# Patient Record
Sex: MALE | URBAN - METROPOLITAN AREA
[De-identification: names, ages, dates, MRNs, and addresses within clinical notes are randomized per-mention and may not be internally consistent; named-entity substitution may affect disease eponyms.]

---

## 2020-08-31 ENCOUNTER — TELEPHONE ENCOUNTER (OUTPATIENT)
Dept: URBAN - METROPOLITAN AREA CLINIC 23 | Facility: CLINIC | Age: 25
End: 2020-08-31

## 2020-08-31 ENCOUNTER — OFFICE VISIT (OUTPATIENT)
Dept: URBAN - NONMETROPOLITAN AREA CLINIC 13 | Facility: CLINIC | Age: 25
End: 2020-08-31
Payer: SELF-PAY

## 2020-08-31 DIAGNOSIS — R19.5 MUCOUS IN STOOLS: ICD-10-CM

## 2020-08-31 DIAGNOSIS — R19.7 DIARRHEA, UNSPECIFIED TYPE: ICD-10-CM

## 2020-08-31 DIAGNOSIS — K62.5 RECTAL BLEEDING: ICD-10-CM

## 2020-08-31 PROCEDURE — 99204 OFFICE O/P NEW MOD 45 MIN: CPT | Performed by: INTERNAL MEDICINE

## 2020-08-31 PROCEDURE — G8420 CALC BMI NORM PARAMETERS: HCPCS | Performed by: INTERNAL MEDICINE

## 2020-08-31 PROCEDURE — G8427 DOCREV CUR MEDS BY ELIG CLIN: HCPCS | Performed by: INTERNAL MEDICINE

## 2020-08-31 RX ORDER — METRONIDAZOLE 500 MG/1
1 TABLET TABLET, FILM COATED ORAL THREE TIMES A DAY
Qty: 30 | OUTPATIENT
Start: 2020-08-31

## 2020-08-31 RX ORDER — CIPROFLOXACIN HYDROCHLORIDE 500 MG/1
1 TABLET TABLET, FILM COATED ORAL
Qty: 20 | OUTPATIENT
Start: 2020-08-31

## 2020-08-31 NOTE — HPI-TODAY'S VISIT:
Mr. Andreas uJrado is a pleasant 23 y/o M who presents for rectal bleeding, mucous in stools, and chronic diarrhea.  He states that since March he developed these symptoms.  No abdominal pain or weight loss.  No joint pains or rashes.  Blood is bright red in color.  He states that labs have looked ok but his other doctors are concerned he might have IBS or IBD.  He is otherwise healthy, on no medications.  He just started a new job recently.

## 2020-09-18 ENCOUNTER — TELEPHONE ENCOUNTER (OUTPATIENT)
Dept: URBAN - METROPOLITAN AREA CLINIC 92 | Facility: CLINIC | Age: 25
End: 2020-09-18

## 2020-09-18 ENCOUNTER — CLAIMS CREATED FROM THE CLAIM WINDOW (OUTPATIENT)
Dept: URBAN - METROPOLITAN AREA CLINIC 4 | Facility: CLINIC | Age: 25
End: 2020-09-18
Payer: SELF-PAY

## 2020-09-18 ENCOUNTER — OFFICE VISIT (OUTPATIENT)
Dept: URBAN - NONMETROPOLITAN AREA SURGERY CENTER 1 | Facility: SURGERY CENTER | Age: 25
End: 2020-09-18
Payer: SELF-PAY

## 2020-09-18 DIAGNOSIS — K51.919 ULCERATIVE COLITIS, UNSPECIFIED WITH UNSPECIFIED COMPLICATIONS: ICD-10-CM

## 2020-09-18 DIAGNOSIS — K51.80 CHRONIC PANCOLONIC ULCERATIVE COLITIS: ICD-10-CM

## 2020-09-18 DIAGNOSIS — K62.5 ANAL BLEEDING: ICD-10-CM

## 2020-09-18 DIAGNOSIS — K51.811 OTHER ULCERATIVE COLITIS WITH RECTAL BLEEDING: ICD-10-CM

## 2020-09-18 DIAGNOSIS — R19.4 ALTERED BOWEL HABITS: ICD-10-CM

## 2020-09-18 PROCEDURE — G9937 DIG OR SURV COLSCO: HCPCS | Performed by: INTERNAL MEDICINE

## 2020-09-18 PROCEDURE — G8907 PT DOC NO EVENTS ON DISCHARG: HCPCS | Performed by: INTERNAL MEDICINE

## 2020-09-18 PROCEDURE — 88305 TISSUE EXAM BY PATHOLOGIST: CPT | Performed by: PATHOLOGY

## 2020-09-18 PROCEDURE — 45380 COLONOSCOPY AND BIOPSY: CPT | Performed by: INTERNAL MEDICINE

## 2020-09-18 RX ORDER — METRONIDAZOLE 500 MG/1
1 TABLET TABLET, FILM COATED ORAL THREE TIMES A DAY
Qty: 30 | Status: ACTIVE | COMMUNITY
Start: 2020-08-31

## 2020-09-18 RX ORDER — BALSALAZIDE DISODIUM 750 MG/1
2 CAPSULES CAPSULE ORAL TWICE A DAY
Qty: 120 CAPSULE | Refills: 1 | OUTPATIENT
Start: 2020-09-18 | End: 2020-11-16

## 2020-09-18 RX ORDER — CIPROFLOXACIN HYDROCHLORIDE 500 MG/1
1 TABLET TABLET, FILM COATED ORAL
Qty: 20 | Status: ACTIVE | COMMUNITY
Start: 2020-08-31

## 2020-09-18 RX ORDER — PREDNISONE 20 MG/1
2 TABLETS TABLET ORAL ONCE A DAY
Qty: 60 TABLET | Refills: 2 | OUTPATIENT
Start: 2020-09-18 | End: 2020-12-16

## 2020-10-05 ENCOUNTER — OFFICE VISIT (OUTPATIENT)
Dept: URBAN - NONMETROPOLITAN AREA CLINIC 13 | Facility: CLINIC | Age: 25
End: 2020-10-05
Payer: SELF-PAY

## 2020-10-05 DIAGNOSIS — K51.90 ULCERATIVE COLITIS: ICD-10-CM

## 2020-10-05 PROCEDURE — 99213 OFFICE O/P EST LOW 20 MIN: CPT | Performed by: NURSE PRACTITIONER

## 2020-10-05 PROCEDURE — G9903 PT SCRN TBCO ID AS NON USER: HCPCS | Performed by: NURSE PRACTITIONER

## 2020-10-05 PROCEDURE — G8427 DOCREV CUR MEDS BY ELIG CLIN: HCPCS | Performed by: NURSE PRACTITIONER

## 2020-10-05 PROCEDURE — G8420 CALC BMI NORM PARAMETERS: HCPCS | Performed by: NURSE PRACTITIONER

## 2020-10-05 RX ORDER — CIPROFLOXACIN HYDROCHLORIDE 500 MG/1
1 TABLET TABLET, FILM COATED ORAL
Qty: 20 | Status: ACTIVE | COMMUNITY
Start: 2020-08-31

## 2020-10-05 RX ORDER — BALSALAZIDE DISODIUM 750 MG/1
2 CAPSULES CAPSULE ORAL TWICE A DAY
Qty: 360
Start: 2020-09-18

## 2020-10-05 RX ORDER — METRONIDAZOLE 500 MG/1
1 TABLET TABLET, FILM COATED ORAL THREE TIMES A DAY
Qty: 30 | Status: ACTIVE | COMMUNITY
Start: 2020-08-31

## 2020-10-05 RX ORDER — BALSALAZIDE DISODIUM 750 MG/1
2 CAPSULES CAPSULE ORAL TWICE A DAY
Qty: 120 CAPSULE | Refills: 1 | Status: ACTIVE | COMMUNITY
Start: 2020-09-18 | End: 2020-11-16

## 2020-10-05 RX ORDER — PREDNISONE 20 MG/1
2 TABLETS TABLET ORAL ONCE A DAY
Qty: 60 TABLET | Refills: 2 | Status: ACTIVE | COMMUNITY
Start: 2020-09-18 | End: 2020-12-16

## 2020-10-05 NOTE — HPI-TODAY'S VISIT:
10/5/2020 Patient returns for colonoscopy follow-up.  Patient returns from recent colonoscopy with inflammation from hepatic flexure to anus.  Pathology consistent with ulcerative colitis.  Today, he reports he is feeling much better.  He is no longer having abdominal pain, loose stool, or rectal bleeding.  He is on balsalazide 2 caps twice daily and prednisone 40 mg daily.  He is hesitant to stop his prednisone as he is feeling so much better.  He has lost 50 pounds throughout this ordeal.  He does report he has put about 10 of them back on.  He asked if he can return to work.sb

## 2020-11-12 ENCOUNTER — ERX REFILL RESPONSE (OUTPATIENT)
Age: 25
End: 2020-11-12

## 2020-11-12 ENCOUNTER — ERX REFILL RESPONSE (OUTPATIENT)
Dept: URBAN - METROPOLITAN AREA CLINIC 92 | Facility: CLINIC | Age: 25
End: 2020-11-12

## 2020-11-12 RX ORDER — BALSALAZIDE DISODIUM 750 MG/1
TAKE 2 CAPSULES BY MOUTH TWICE A DAY CAPSULE ORAL
Qty: 360 | Refills: 0

## 2020-11-12 RX ORDER — BALSALAZIDE DISODIUM 750 MG/1
2 CAPSULES CAPSULE ORAL TWICE A DAY
Qty: 120 | Refills: 0

## 2021-01-04 ENCOUNTER — OFFICE VISIT (OUTPATIENT)
Dept: URBAN - NONMETROPOLITAN AREA CLINIC 13 | Facility: CLINIC | Age: 26
End: 2021-01-04
Payer: SELF-PAY

## 2021-01-04 ENCOUNTER — OFFICE VISIT (OUTPATIENT)
Dept: URBAN - NONMETROPOLITAN AREA CLINIC 13 | Facility: CLINIC | Age: 26
End: 2021-01-04

## 2021-01-04 DIAGNOSIS — K51.90 ULCERATIVE COLITIS: ICD-10-CM

## 2021-01-04 PROCEDURE — 99212 OFFICE O/P EST SF 10 MIN: CPT | Performed by: NURSE PRACTITIONER

## 2021-01-04 PROCEDURE — G8482 FLU IMMUNIZE ORDER/ADMIN: HCPCS | Performed by: NURSE PRACTITIONER

## 2021-01-04 PROCEDURE — G9903 PT SCRN TBCO ID AS NON USER: HCPCS | Performed by: NURSE PRACTITIONER

## 2021-01-04 PROCEDURE — G8427 DOCREV CUR MEDS BY ELIG CLIN: HCPCS | Performed by: NURSE PRACTITIONER

## 2021-01-04 PROCEDURE — G8420 CALC BMI NORM PARAMETERS: HCPCS | Performed by: NURSE PRACTITIONER

## 2021-01-04 RX ORDER — BALSALAZIDE DISODIUM 750 MG/1
2 CAPSULES CAPSULE ORAL TWICE A DAY
Qty: 120 | Refills: 0 | Status: ACTIVE | COMMUNITY

## 2021-01-04 RX ORDER — METRONIDAZOLE 500 MG/1
1 TABLET TABLET, FILM COATED ORAL THREE TIMES A DAY
Qty: 30 | Status: ACTIVE | COMMUNITY
Start: 2020-08-31

## 2021-01-04 RX ORDER — BALSALAZIDE DISODIUM 750 MG/1
TAKE 2 CAPSULES BY MOUTH TWICE A DAY CAPSULE ORAL
Qty: 360 | Refills: 0 | Status: ACTIVE | COMMUNITY

## 2021-01-04 RX ORDER — CIPROFLOXACIN HYDROCHLORIDE 500 MG/1
1 TABLET TABLET, FILM COATED ORAL
Qty: 20 | Status: ACTIVE | COMMUNITY
Start: 2020-08-31

## 2021-01-04 RX ORDER — BALSALAZIDE DISODIUM 750 MG/1
2 CAPSULES CAPSULE ORAL TWICE A DAY
Qty: 360

## 2021-01-04 NOTE — HPI-TODAY'S VISIT:
Mr deni  returns for UC f/u.  Patient returns from recent 2020 colonoscopy with inflammation from hepatic flexure to anus.  Pathology consistent with ulcerative colitis.  Today, he reports he is feeling much better. He has weaned off steroids and denies any loose stool. some occasional cramping if he eats too much.  He is on balsalazide 2 caps twice daily. SB

## 2021-03-12 ENCOUNTER — TELEPHONE ENCOUNTER (OUTPATIENT)
Dept: URBAN - METROPOLITAN AREA CLINIC 92 | Facility: CLINIC | Age: 26
End: 2021-03-12

## 2021-03-12 RX ORDER — PREDNISONE 20 MG/1
40MG X7 DAYS, 30MGX 7 DAYS, 20MG X 7 DAYS, AND 10MG X7 DAYS AND STOP TABLET ORAL ONCE A DAY
Qty: 35 TABLET | Refills: 0 | OUTPATIENT
Start: 2021-03-12 | End: 2021-04-09

## 2021-07-12 ENCOUNTER — WEB ENCOUNTER (OUTPATIENT)
Dept: URBAN - NONMETROPOLITAN AREA CLINIC 13 | Facility: CLINIC | Age: 26
End: 2021-07-12

## 2021-07-12 ENCOUNTER — OFFICE VISIT (OUTPATIENT)
Dept: URBAN - NONMETROPOLITAN AREA CLINIC 13 | Facility: CLINIC | Age: 26
End: 2021-07-12
Payer: SELF-PAY

## 2021-07-12 DIAGNOSIS — K51.90 ULCERATIVE COLITIS: ICD-10-CM

## 2021-07-12 PROCEDURE — 99213 OFFICE O/P EST LOW 20 MIN: CPT | Performed by: INTERNAL MEDICINE

## 2021-07-12 RX ORDER — BALSALAZIDE DISODIUM 750 MG/1
TAKE 2 CAPSULES BY MOUTH TWICE A DAY CAPSULE ORAL
Qty: 360 | Refills: 0 | Status: ACTIVE | COMMUNITY

## 2021-07-12 RX ORDER — SULFASALAZINE 500 MG/1
1 TABLET TABLET ORAL
Qty: 120 | Refills: 6 | OUTPATIENT
Start: 2021-07-12 | End: 2022-02-06

## 2021-07-12 RX ORDER — BALSALAZIDE DISODIUM 750 MG/1
2 CAPSULES CAPSULE ORAL TWICE A DAY
Qty: 360 | Status: ACTIVE | COMMUNITY

## 2021-07-12 RX ORDER — METRONIDAZOLE 500 MG/1
1 TABLET TABLET, FILM COATED ORAL THREE TIMES A DAY
Qty: 30 | Status: ACTIVE | COMMUNITY
Start: 2020-08-31

## 2021-07-12 RX ORDER — CIPROFLOXACIN HYDROCHLORIDE 500 MG/1
1 TABLET TABLET, FILM COATED ORAL
Qty: 20 | Status: ACTIVE | COMMUNITY
Start: 2020-08-31

## 2021-07-12 NOTE — HPI-TODAY'S VISIT:
Mr cheema  returns for UC f/u.  Patient returns from recent 2020 colonoscopy with inflammation from hepatic flexure to anus.  Pathology consistent with ulcerative colitis.  Today, he reports he has some loose stool and bleeding some days. some occasional cramping if he eats too much.  He is on balsalazide 2 caps twice daily. SB

## 2022-01-17 ENCOUNTER — OFFICE VISIT (OUTPATIENT)
Dept: URBAN - NONMETROPOLITAN AREA CLINIC 13 | Facility: CLINIC | Age: 27
End: 2022-01-17

## 2022-01-24 ENCOUNTER — OFFICE VISIT (OUTPATIENT)
Dept: URBAN - NONMETROPOLITAN AREA CLINIC 13 | Facility: CLINIC | Age: 27
End: 2022-01-24
Payer: SELF-PAY

## 2022-01-24 VITALS
HEIGHT: 71 IN | HEART RATE: 73 BPM | BODY MASS INDEX: 32.2 KG/M2 | SYSTOLIC BLOOD PRESSURE: 131 MMHG | WEIGHT: 230 LBS | DIASTOLIC BLOOD PRESSURE: 83 MMHG

## 2022-01-24 DIAGNOSIS — K51.90 ULCERATIVE COLITIS: ICD-10-CM

## 2022-01-24 PROCEDURE — 99213 OFFICE O/P EST LOW 20 MIN: CPT | Performed by: INTERNAL MEDICINE

## 2022-01-24 RX ORDER — SULFASALAZINE 500 MG/1
1 TABLET TABLET ORAL
Qty: 120 | Refills: 6 | Status: ACTIVE | COMMUNITY
Start: 2021-07-12 | End: 2022-02-06

## 2022-01-24 RX ORDER — CIPROFLOXACIN HYDROCHLORIDE 500 MG/1
1 TABLET TABLET, FILM COATED ORAL
Qty: 20 | Status: ACTIVE | COMMUNITY
Start: 2020-08-31

## 2022-01-24 RX ORDER — SULFASALAZINE 500 MG/1
1 TABLET TABLET ORAL
Qty: 120 | Refills: 6 | OUTPATIENT

## 2022-01-24 RX ORDER — METRONIDAZOLE 500 MG/1
1 TABLET TABLET, FILM COATED ORAL THREE TIMES A DAY
Qty: 30 | Status: ACTIVE | COMMUNITY
Start: 2020-08-31

## 2022-01-24 RX ORDER — BALSALAZIDE DISODIUM 750 MG/1
TAKE 2 CAPSULES BY MOUTH TWICE A DAY CAPSULE ORAL
Qty: 360 | Refills: 0 | Status: ACTIVE | COMMUNITY

## 2022-01-24 RX ORDER — BALSALAZIDE DISODIUM 750 MG/1
2 CAPSULES CAPSULE ORAL TWICE A DAY
Qty: 360 | Status: ACTIVE | COMMUNITY

## 2022-01-24 NOTE — HPI-TODAY'S VISIT:
1/24/22: Mr. Cheema returns for follow-up of extensive ulcerative colitis.  Since his last clinic visit, he was changed from balsalazide 2 caps twice daily to sulfsalazine 500 mg po QID.  Today he reports that he has been taking sufasalazine 2 pills in the AM only.  He is having some mucous in his stools and occasional bleeding.  He is having 3 stools daily.  7/12/21: Mr cheema  returns for UC f/u.  Patient returns from recent 2020 colonoscopy with inflammation from hepatic flexure to anus.  Pathology consistent with ulcerative colitis.  Today, he reports he has some loose stool and bleeding some days. some occasional cramping if he eats too much.  He is on balsalazide 2 caps twice daily. SB

## 2022-04-25 ENCOUNTER — OFFICE VISIT (OUTPATIENT)
Dept: URBAN - NONMETROPOLITAN AREA CLINIC 13 | Facility: CLINIC | Age: 27
End: 2022-04-25
Payer: SELF-PAY

## 2022-04-25 VITALS
BODY MASS INDEX: 32.76 KG/M2 | WEIGHT: 234 LBS | DIASTOLIC BLOOD PRESSURE: 81 MMHG | HEART RATE: 80 BPM | HEIGHT: 71 IN | SYSTOLIC BLOOD PRESSURE: 134 MMHG

## 2022-04-25 DIAGNOSIS — D84.9 IMMUNOSUPPRESSED STATUS: ICD-10-CM

## 2022-04-25 DIAGNOSIS — K51.90 ULCERATIVE COLITIS: ICD-10-CM

## 2022-04-25 PROCEDURE — 99213 OFFICE O/P EST LOW 20 MIN: CPT | Performed by: NURSE PRACTITIONER

## 2022-04-25 RX ORDER — BALSALAZIDE DISODIUM 750 MG/1
TAKE 2 CAPSULES BY MOUTH TWICE A DAY CAPSULE ORAL
Qty: 360 | Refills: 0 | Status: ACTIVE | COMMUNITY

## 2022-04-25 RX ORDER — ADALIMUMAB 40MG/0.4ML
AS DIRECTED START DAY 28 KIT SUBCUTANEOUS EVERY OTHER WEEK
Qty: 2 PRE-FILLED PEN SYRINGE | Refills: 6 | OUTPATIENT
Start: 2022-04-25 | End: 2022-11-06

## 2022-04-25 RX ORDER — ADALIMUMAB 80MG/0.8ML
STARTER KIT. 160MG DAY 1 AND 80MG DAY 14 KIT SUBCUTANEOUS EVERY 2 WEEKS
Qty: 3 PRE-FILLED PEN SYRINGE | Refills: 0 | OUTPATIENT
Start: 2022-04-25 | End: 2022-05-23

## 2022-04-25 RX ORDER — BALSALAZIDE DISODIUM 750 MG/1
2 CAPSULES CAPSULE ORAL TWICE A DAY
Qty: 360 | Status: ACTIVE | COMMUNITY

## 2022-04-25 RX ORDER — METRONIDAZOLE 500 MG/1
1 TABLET TABLET, FILM COATED ORAL THREE TIMES A DAY
Qty: 30 | Status: ACTIVE | COMMUNITY
Start: 2020-08-31

## 2022-04-25 RX ORDER — CIPROFLOXACIN HYDROCHLORIDE 500 MG/1
1 TABLET TABLET, FILM COATED ORAL
Qty: 20 | Status: ACTIVE | COMMUNITY
Start: 2020-08-31

## 2022-04-25 RX ORDER — SULFASALAZINE 500 MG/1
1 TABLET TABLET ORAL
Qty: 120 | Refills: 6 | Status: ACTIVE | COMMUNITY

## 2022-04-28 ENCOUNTER — TELEPHONE ENCOUNTER (OUTPATIENT)
Dept: URBAN - METROPOLITAN AREA CLINIC 92 | Facility: CLINIC | Age: 27
End: 2022-04-28

## 2022-04-28 LAB
A/G RATIO: 1.7
ALBUMIN: 4.6
ALKALINE PHOSPHATASE: 93
ALT (SGPT): 24
AST (SGOT): 20
BASO (ABSOLUTE): 0
BASOS: 0
BILIRUBIN, TOTAL: 0.5
BUN/CREATININE RATIO: 16
BUN: 15
CALCIUM: 10
CARBON DIOXIDE, TOTAL: 20
CHLORIDE: 103
CREATININE: 0.91
EGFR: 119
EOS (ABSOLUTE): 0.1
EOS: 2
GLOBULIN, TOTAL: 2.7
GLUCOSE: 102
HBSAG SCREEN: NEGATIVE
HCV AB: <0.1
HEMATOCRIT: 45.6
HEMATOLOGY COMMENTS:: (no result)
HEMOGLOBIN: 15.1
HEP A AB, IGM: NEGATIVE
HEP B CORE AB, IGM: NEGATIVE
IMMATURE CELLS: (no result)
IMMATURE GRANS (ABS): 0
IMMATURE GRANULOCYTES: 0
INTERPRETATION:: (no result)
LYMPHS (ABSOLUTE): 1.5
LYMPHS: 28
MCH: 30.4
MCHC: 33.1
MCV: 92
MONOCYTES(ABSOLUTE): 0.4
MONOCYTES: 7
NEUTROPHILS (ABSOLUTE): 3.3
NEUTROPHILS: 63
NRBC: (no result)
PLATELETS: 205
POTASSIUM: 4.2
PROTEIN, TOTAL: 7.3
QUANTIFERON CRITERIA: (no result)
QUANTIFERON INCUBATION: (no result)
QUANTIFERON MITOGEN VALUE: >10
QUANTIFERON NIL VALUE: 0.19
QUANTIFERON TB1 AG VALUE: 0.16
QUANTIFERON TB2 AG VALUE: 0.16
QUANTIFERON-TB GOLD PLUS: NEGATIVE
RBC: 4.97
RDW: 11.8
SODIUM: 141
WBC: 5.4

## 2022-10-25 ENCOUNTER — OFFICE VISIT (OUTPATIENT)
Dept: URBAN - NONMETROPOLITAN AREA CLINIC 2 | Facility: CLINIC | Age: 27
End: 2022-10-25
Payer: SELF-PAY

## 2022-10-25 ENCOUNTER — WEB ENCOUNTER (OUTPATIENT)
Dept: URBAN - NONMETROPOLITAN AREA CLINIC 2 | Facility: CLINIC | Age: 27
End: 2022-10-25

## 2022-10-25 VITALS
SYSTOLIC BLOOD PRESSURE: 110 MMHG | WEIGHT: 245 LBS | TEMPERATURE: 98.5 F | HEIGHT: 71 IN | DIASTOLIC BLOOD PRESSURE: 70 MMHG | HEART RATE: 88 BPM | BODY MASS INDEX: 34.3 KG/M2

## 2022-10-25 DIAGNOSIS — D84.9 IMMUNOSUPPRESSED STATUS: ICD-10-CM

## 2022-10-25 DIAGNOSIS — K51.90 ULCERATIVE COLITIS: ICD-10-CM

## 2022-10-25 PROBLEM — 38013005: Status: ACTIVE | Noted: 2022-04-25

## 2022-10-25 PROBLEM — 64766004 ULCERATIVE COLITIS: Status: ACTIVE | Noted: 2020-10-05

## 2022-10-25 PROCEDURE — 99213 OFFICE O/P EST LOW 20 MIN: CPT | Performed by: NURSE PRACTITIONER

## 2022-10-25 RX ORDER — METRONIDAZOLE 500 MG/1
1 TABLET TABLET, FILM COATED ORAL THREE TIMES A DAY
Qty: 30 | Status: ACTIVE | COMMUNITY
Start: 2020-08-31

## 2022-10-25 RX ORDER — ADALIMUMAB 40MG/0.4ML
AS DIRECTED START DAY 28 KIT SUBCUTANEOUS EVERY OTHER WEEK
Qty: 2 PRE-FILLED PEN SYRINGE | Refills: 6 | OUTPATIENT

## 2022-10-25 RX ORDER — SULFASALAZINE 500 MG/1
1 TABLET TABLET ORAL
Qty: 120 | Refills: 6 | Status: ACTIVE | COMMUNITY

## 2022-10-25 RX ORDER — CIPROFLOXACIN HYDROCHLORIDE 500 MG/1
1 TABLET TABLET, FILM COATED ORAL
Qty: 20 | Status: ACTIVE | COMMUNITY
Start: 2020-08-31

## 2022-10-25 RX ORDER — BALSALAZIDE DISODIUM 750 MG/1
TAKE 2 CAPSULES BY MOUTH TWICE A DAY CAPSULE ORAL
Qty: 360 | Refills: 0 | Status: ACTIVE | COMMUNITY

## 2022-10-25 RX ORDER — ADALIMUMAB 40MG/0.4ML
AS DIRECTED START DAY 28 KIT SUBCUTANEOUS EVERY OTHER WEEK
Qty: 2 PRE-FILLED PEN SYRINGE | Refills: 6 | Status: ACTIVE | COMMUNITY
Start: 2022-04-25 | End: 2022-11-06

## 2022-10-25 RX ORDER — BALSALAZIDE DISODIUM 750 MG/1
2 CAPSULES CAPSULE ORAL TWICE A DAY
Qty: 360 | Status: ACTIVE | COMMUNITY

## 2022-10-25 NOTE — PHYSICAL EXAM NEUROLOGIC:
Hpi Title: Evaluation of Skin Lesions oriented to person, place and time How Severe Are Your Spot(S)?: mild Have Your Spot(S) Been Treated In The Past?: has not been treated

## 2022-10-25 NOTE — HPI-TODAY'S VISIT:
Mr deni  returns for UC f/u.  Patient returns from recent 2020 colonoscopy with inflammation from hepatic flexure to anus.  Pathology consistent with ulcerative colitis. He was able to get on patient assistance for humira a few months ago and is doing much better. stools are regular. no blood. SB

## 2022-10-26 LAB
A/G RATIO: 1.6
ABSOLUTE BASOPHILS: 17
ABSOLUTE EOSINOPHILS: 129
ABSOLUTE LYMPHOCYTES: 2786
ABSOLUTE MONOCYTES: 490
ABSOLUTE NEUTROPHILS: 5177
ALBUMIN: 4.3
ALKALINE PHOSPHATASE: 82
ALT (SGPT): 19
AST (SGOT): 15
BASOPHILS: 0.2
BILIRUBIN, TOTAL: 0.9
BUN/CREATININE RATIO: 15
BUN: 19
CALCIUM: 9.1
CARBON DIOXIDE, TOTAL: 26
CHLORIDE: 105
CREATININE: 1.25
EGFR: 81
EOSINOPHILS: 1.5
GLOBULIN, TOTAL: 2.7
GLUCOSE: 95
HEMATOCRIT: 44.2
HEMOGLOBIN: 15.3
LYMPHOCYTES: 32.4
MCH: 30.4
MCHC: 34.6
MCV: 87.9
MONOCYTES: 5.7
MPV: 12.3
NEUTROPHILS: 60.2
PLATELET COUNT: 219
POTASSIUM: 4.3
PROTEIN, TOTAL: 7
RDW: 11.9
RED BLOOD CELL COUNT: 5.03
SODIUM: 139
WHITE BLOOD CELL COUNT: 8.6

## 2022-11-02 ENCOUNTER — TELEPHONE ENCOUNTER (OUTPATIENT)
Dept: URBAN - NONMETROPOLITAN AREA CLINIC 2 | Facility: CLINIC | Age: 27
End: 2022-11-02

## 2023-04-28 ENCOUNTER — OFFICE VISIT (OUTPATIENT)
Dept: URBAN - NONMETROPOLITAN AREA CLINIC 2 | Facility: CLINIC | Age: 28
End: 2023-04-28
Payer: SELF-PAY

## 2023-04-28 VITALS
HEART RATE: 85 BPM | HEIGHT: 71 IN | DIASTOLIC BLOOD PRESSURE: 87 MMHG | BODY MASS INDEX: 34.3 KG/M2 | SYSTOLIC BLOOD PRESSURE: 106 MMHG | WEIGHT: 245 LBS

## 2023-04-28 DIAGNOSIS — D84.9 IMMUNOSUPPRESSED STATUS: ICD-10-CM

## 2023-04-28 DIAGNOSIS — K51.80 CHRONIC PANCOLONIC ULCERATIVE COLITIS: ICD-10-CM

## 2023-04-28 DIAGNOSIS — K51.90 ULCERATIVE COLITIS: ICD-10-CM

## 2023-04-28 PROCEDURE — 99213 OFFICE O/P EST LOW 20 MIN: CPT | Performed by: INTERNAL MEDICINE

## 2023-04-28 RX ORDER — CIPROFLOXACIN HYDROCHLORIDE 500 MG/1
1 TABLET TABLET, FILM COATED ORAL
Qty: 20 | Status: ACTIVE | COMMUNITY
Start: 2020-08-31

## 2023-04-28 RX ORDER — ADALIMUMAB 40MG/0.4ML
AS DIRECTED START DAY 28 KIT SUBCUTANEOUS EVERY OTHER WEEK
Qty: 2 PRE-FILLED PEN SYRINGE | Refills: 6 | Status: ACTIVE | COMMUNITY

## 2023-04-28 RX ORDER — SULFASALAZINE 500 MG/1
1 TABLET TABLET ORAL
Qty: 120 | Refills: 6 | Status: ACTIVE | COMMUNITY

## 2023-04-28 RX ORDER — BALSALAZIDE DISODIUM 750 MG/1
TAKE 2 CAPSULES BY MOUTH TWICE A DAY CAPSULE ORAL
Qty: 360 | Refills: 0 | Status: ACTIVE | COMMUNITY

## 2023-04-28 RX ORDER — ADALIMUMAB 40MG/0.4ML
AS DIRECTED START DAY 28 KIT SUBCUTANEOUS EVERY OTHER WEEK
Qty: 2 PRE-FILLED PEN SYRINGE | Refills: 6 | OUTPATIENT

## 2023-04-28 RX ORDER — METRONIDAZOLE 500 MG/1
1 TABLET TABLET, FILM COATED ORAL THREE TIMES A DAY
Qty: 30 | Status: ACTIVE | COMMUNITY
Start: 2020-08-31

## 2023-04-28 RX ORDER — BALSALAZIDE DISODIUM 750 MG/1
2 CAPSULES CAPSULE ORAL TWICE A DAY
Qty: 360 | Status: ACTIVE | COMMUNITY

## 2023-04-28 NOTE — HPI-TODAY'S VISIT:
4/28/23: Mr Cheema returns for UC f/u.  He has been on Humira now for about a year and is doing well on it.  He denies any current rectal bleeding or diarrhea.  He is getting Humira monthly with patient assistance.  10/25/22: Mr cheema  returns for UC f/u.  Patient returns from recent 2020 colonoscopy with inflammation from hepatic flexure to anus.  Pathology consistent with ulcerative colitis. He was able to get on patient assistance for humira a few months ago and is doing much better. stools are regular. no blood. SB

## 2023-10-27 ENCOUNTER — OFFICE VISIT (OUTPATIENT)
Dept: URBAN - NONMETROPOLITAN AREA CLINIC 2 | Facility: CLINIC | Age: 28
End: 2023-10-27
Payer: SELF-PAY

## 2023-10-27 VITALS
HEART RATE: 74 BPM | BODY MASS INDEX: 34.86 KG/M2 | SYSTOLIC BLOOD PRESSURE: 160 MMHG | HEIGHT: 71 IN | DIASTOLIC BLOOD PRESSURE: 94 MMHG | WEIGHT: 249 LBS

## 2023-10-27 DIAGNOSIS — D84.9 IMMUNOSUPPRESSED STATUS: ICD-10-CM

## 2023-10-27 DIAGNOSIS — K51.90 ULCERATIVE COLITIS: ICD-10-CM

## 2023-10-27 PROCEDURE — 99214 OFFICE O/P EST MOD 30 MIN: CPT | Performed by: INTERNAL MEDICINE

## 2023-10-27 RX ORDER — BALSALAZIDE DISODIUM 750 MG/1
TAKE 2 CAPSULES BY MOUTH TWICE A DAY CAPSULE ORAL
Qty: 360 | Refills: 0 | Status: DISCONTINUED | COMMUNITY

## 2023-10-27 RX ORDER — METRONIDAZOLE 500 MG/1
1 TABLET TABLET, FILM COATED ORAL THREE TIMES A DAY
Qty: 30 | Status: DISCONTINUED | COMMUNITY
Start: 2020-08-31

## 2023-10-27 RX ORDER — BALSALAZIDE DISODIUM 750 MG/1
2 CAPSULES CAPSULE ORAL TWICE A DAY
Qty: 360 | Status: DISCONTINUED | COMMUNITY

## 2023-10-27 RX ORDER — CIPROFLOXACIN HYDROCHLORIDE 500 MG/1
1 TABLET TABLET, FILM COATED ORAL
Qty: 20 | Status: DISCONTINUED | COMMUNITY
Start: 2020-08-31

## 2023-10-27 RX ORDER — ADALIMUMAB 40MG/0.4ML
AS DIRECTED START DAY 28 KIT SUBCUTANEOUS EVERY OTHER WEEK
Qty: 2 PRE-FILLED PEN SYRINGE | Refills: 6 | Status: ACTIVE | COMMUNITY

## 2023-10-27 RX ORDER — ADALIMUMAB 40MG/0.4ML
AS DIRECTED START DAY 28 KIT SUBCUTANEOUS EVERY OTHER WEEK
Qty: 2 PRE-FILLED PEN SYRINGE | Refills: 6 | OUTPATIENT

## 2023-10-27 RX ORDER — SULFASALAZINE 500 MG/1
1 TABLET TABLET ORAL
Qty: 120 | Refills: 6 | Status: DISCONTINUED | COMMUNITY

## 2023-10-27 NOTE — HPI-TODAY'S VISIT:
10/27/23: Mr. Cheema returns for follow-up of ulcerative colitis.  He has been maintained on Humira.  He has been doing remarkably well on Humira.  No diarrhea or rectal bleeding.  He is changing jobs and hopeful to have health insurance soon.    4/28/23: Mr Cheema returns for UC f/u.  He has been on Humira now for about a year and is doing well on it.  He denies any current rectal bleeding or diarrhea.  He is getting Humira monthly with patient assistance.  10/25/22: Mr cheema  returns for UC f/u.  Patient returns from recent 2020 colonoscopy with inflammation from hepatic flexure to anus.  Pathology consistent with ulcerative colitis. He was able to get on patient assistance for humira a few months ago and is doing much better. stools are regular. no blood. SB

## 2024-04-22 ENCOUNTER — OV EP (OUTPATIENT)
Dept: URBAN - NONMETROPOLITAN AREA CLINIC 2 | Facility: CLINIC | Age: 29
End: 2024-04-22
Payer: SELF-PAY

## 2024-04-22 VITALS
DIASTOLIC BLOOD PRESSURE: 83 MMHG | HEART RATE: 80 BPM | BODY MASS INDEX: 35 KG/M2 | SYSTOLIC BLOOD PRESSURE: 134 MMHG | HEIGHT: 71 IN | WEIGHT: 250 LBS

## 2024-04-22 DIAGNOSIS — K51.90 ULCERATIVE COLITIS: ICD-10-CM

## 2024-04-22 DIAGNOSIS — D84.9 IMMUNOSUPPRESSED STATUS: ICD-10-CM

## 2024-04-22 PROCEDURE — 99214 OFFICE O/P EST MOD 30 MIN: CPT | Performed by: NURSE PRACTITIONER

## 2024-04-22 RX ORDER — ADALIMUMAB 40MG/0.4ML
AS DIRECTED START DAY 28 KIT SUBCUTANEOUS EVERY OTHER WEEK
Qty: 2 PRE-FILLED PEN SYRINGE | Refills: 6 | OUTPATIENT

## 2024-04-22 RX ORDER — ADALIMUMAB 40MG/0.4ML
AS DIRECTED START DAY 28 KIT SUBCUTANEOUS EVERY OTHER WEEK
Qty: 2 PRE-FILLED PEN SYRINGE | Refills: 6 | Status: ACTIVE | COMMUNITY

## 2024-04-22 NOTE — HPI-TODAY'S VISIT:
Mr. Johns is a 28-year-old male who presents today for follow-up of ulcerative colitis.  He has been on Humira since 2022 and continues to do well.  He denies any abdominal pain, rectal bleeding, diarrhea, or weight loss.  He unfortunately lost his job that he obtained around 6 months ago on Tuesday.  He states he is unable to obtain labs or proceed with routine colonoscopy at this time.  Patient aware that he is due for these items and will contact us when he gets insurance or money to afford the procedures and labs.  Denies any GI questions or concerns he reports feeling very well.  LG.

## 2024-04-26 ENCOUNTER — OV EP (OUTPATIENT)
Dept: URBAN - NONMETROPOLITAN AREA CLINIC 2 | Facility: CLINIC | Age: 29
End: 2024-04-26

## 2024-06-27 ENCOUNTER — TELEPHONE ENCOUNTER (OUTPATIENT)
Dept: URBAN - NONMETROPOLITAN AREA CLINIC 2 | Facility: CLINIC | Age: 29
End: 2024-06-27

## 2024-10-21 ENCOUNTER — OFFICE VISIT (OUTPATIENT)
Dept: URBAN - NONMETROPOLITAN AREA CLINIC 2 | Facility: CLINIC | Age: 29
End: 2024-10-21
Payer: SELF-PAY

## 2024-10-21 ENCOUNTER — DASHBOARD ENCOUNTERS (OUTPATIENT)
Age: 29
End: 2024-10-21

## 2024-10-21 VITALS
HEIGHT: 71 IN | DIASTOLIC BLOOD PRESSURE: 82 MMHG | SYSTOLIC BLOOD PRESSURE: 125 MMHG | WEIGHT: 261.4 LBS | HEART RATE: 74 BPM | BODY MASS INDEX: 36.6 KG/M2

## 2024-10-21 DIAGNOSIS — K51.90 ULCERATIVE COLITIS: ICD-10-CM

## 2024-10-21 DIAGNOSIS — D84.9 IMMUNOSUPPRESSED STATUS: ICD-10-CM

## 2024-10-21 PROCEDURE — 99214 OFFICE O/P EST MOD 30 MIN: CPT | Performed by: NURSE PRACTITIONER

## 2024-10-21 RX ORDER — ADALIMUMAB 40MG/0.4ML
AS DIRECTED START DAY 28 KIT SUBCUTANEOUS EVERY OTHER WEEK
Qty: 2 PRE-FILLED PEN SYRINGE | Refills: 6 | OUTPATIENT

## 2024-10-21 RX ORDER — ADALIMUMAB 40MG/0.4ML
AS DIRECTED START DAY 28 KIT SUBCUTANEOUS EVERY OTHER WEEK
Qty: 2 PRE-FILLED PEN SYRINGE | Refills: 6 | Status: ACTIVE | COMMUNITY

## 2024-10-21 NOTE — HPI-TODAY'S VISIT:
Sav returns for f/u of UC. He continues to use Humira every other week and denies any abd pain, bleeding, weight loss, diarrhea or constipation. He got a new job but hasn't saved enough to proceed with a colonoscopy - hopes to schedule at his next visit. He does agree to obtain a TB test given continued use of immunosuppressant. No other GI questions or concerns today. LG.

## 2024-10-21 NOTE — HPI-OTHER HISTORIES
4/2024: Mr. Cheema is a 28-year-old male who presents today for follow-up of ulcerative colitis. He has been on Humira since 2022 and continues to do well. He denies any abdominal pain, rectal bleeding, diarrhea, or weight loss. He unfortunately lost his job that he obtained around 6 months ago on Tuesday. He states he is unable to obtain labs or proceed with routine colonoscopy at this time. Patient aware that he is due for these items and will contact us when he gets insurance or money to afford the procedures and labs. Denies any GI questions or concerns he reports feeling very well. LG.  10/27/23: Mr. Cheema returns for follow-up of ulcerative colitis. He has been maintained on Humira. He has been doing remarkably well on Humira. No diarrhea or rectal bleeding. He is changing jobs and hopeful to have health insurance soon.   4/28/23: Mr Cheema returns for UC f/u. He has been on Humira now for about a year and is doing well on it. He denies any current rectal bleeding or diarrhea. He is getting Humira monthly with patient assistance.   10/25/22: Mr cheema returns for UC f/u. Patient returns from recent 2020 colonoscopy with inflammation from hepatic flexure to anus. Pathology consistent with ulcerative colitis. He was able to get on patient assistance for humira a few months ago and is doing much better. stools are regular. no blood. SB

## 2024-10-21 NOTE — PHYSICAL EXAM CONSTITUTIONAL:
well developed, well nourished , in no acute distress , ambulating without difficulty , normal communication ability  the patient and cardiac admitting team

## 2024-10-24 LAB
MITOGEN-NIL: 7.05
QUANTIFERON NIL VALUE: 0.03
QUANTIFERON TB1 AG VALUE: 0
QUANTIFERON TB2 AG VALUE: 0
QUANTIFERON-TB GOLD PLUS: NEGATIVE

## 2025-04-21 ENCOUNTER — OFFICE VISIT (OUTPATIENT)
Dept: URBAN - NONMETROPOLITAN AREA CLINIC 2 | Facility: CLINIC | Age: 30
End: 2025-04-21
Payer: COMMERCIAL

## 2025-04-21 ENCOUNTER — LAB OUTSIDE AN ENCOUNTER (OUTPATIENT)
Dept: URBAN - NONMETROPOLITAN AREA CLINIC 2 | Facility: CLINIC | Age: 30
End: 2025-04-21

## 2025-04-21 DIAGNOSIS — D84.9 IMMUNOSUPPRESSED STATUS: ICD-10-CM

## 2025-04-21 DIAGNOSIS — K51.90 ULCERATIVE COLITIS: ICD-10-CM

## 2025-04-21 PROCEDURE — 99214 OFFICE O/P EST MOD 30 MIN: CPT | Performed by: NURSE PRACTITIONER

## 2025-04-21 RX ORDER — ADALIMUMAB 40MG/0.4ML
AS DIRECTED START DAY 28 KIT SUBCUTANEOUS EVERY OTHER WEEK
Qty: 2 PRE-FILLED PEN SYRINGE | Refills: 6 | Status: ACTIVE | COMMUNITY

## 2025-04-21 RX ORDER — ADALIMUMAB 40MG/0.4ML
AS DIRECTED START DAY 28 KIT SUBCUTANEOUS EVERY OTHER WEEK
Qty: 2 PRE-FILLED PEN SYRINGE | Refills: 6 | OUTPATIENT
Start: 2025-04-21 | End: 2025-11-03

## 2025-04-21 NOTE — HPI-TODAY'S VISIT:
Sav presents today for follow-up of ulcerative colitis.  He continues to do well on Humira.  No abdominal pain, bleeding, mucus, frequency of stools, or diarrhea.  He is now employed and has insurance intact proceed with a colonoscopy.  Last procedure in 2020. LG

## 2025-04-21 NOTE — HPI-OTHER HISTORIES
10/2024: Sav returns for f/u of UC. He continues to use Humira every other week and denies any abd pain, bleeding, weight loss, diarrhea or constipation. He got a new job but hasn't saved enough to proceed with a colonoscopy - hopes to schedule at his next visit. He does agree to obtain a TB test given continued use of immunosuppressant. No other GI questions or concerns today. LG.  4/2024: Mr. Cheema is a 28-year-old male who presents today for follow-up of ulcerative colitis. He has been on Humira since 2022 and continues to do well. He denies any abdominal pain, rectal bleeding, diarrhea, or weight loss. He unfortunately lost his job that he obtained around 6 months ago on Tuesday. He states he is unable to obtain labs or proceed with routine colonoscopy at this time. Patient aware that he is due for these items and will contact us when he gets insurance or money to afford the procedures and labs. Denies any GI questions or concerns he reports feeling very well. LG.  10/27/23: Mr. Cheema returns for follow-up of ulcerative colitis. He has been maintained on Humira. He has been doing remarkably well on Humira. No diarrhea or rectal bleeding. He is changing jobs and hopeful to have health insurance soon.   4/28/23: Mr Cheema returns for UC f/u. He has been on Humira now for about a year and is doing well on it. He denies any current rectal bleeding or diarrhea. He is getting Humira monthly with patient assistance.   10/25/22: Mr cheema returns for UC f/u. Patient returns from recent 2020 colonoscopy with inflammation from hepatic flexure to anus. Pathology consistent with ulcerative colitis. He was able to get on patient assistance for humira a few months ago and is doing much better. stools are regular. no blood. SB

## 2025-05-13 ENCOUNTER — CLAIMS CREATED FROM THE CLAIM WINDOW (OUTPATIENT)
Dept: URBAN - NONMETROPOLITAN AREA SURGERY CENTER 1 | Facility: SURGERY CENTER | Age: 30
End: 2025-05-13
Payer: COMMERCIAL

## 2025-05-13 ENCOUNTER — CLAIMS CREATED FROM THE CLAIM WINDOW (OUTPATIENT)
Dept: URBAN - METROPOLITAN AREA CLINIC 4 | Facility: CLINIC | Age: 30
End: 2025-05-13
Payer: COMMERCIAL

## 2025-05-13 DIAGNOSIS — K51.80 OTHER ULCERATIVE COLITIS WITHOUT COMPLICATIONS: ICD-10-CM

## 2025-05-13 DIAGNOSIS — K63.89 OTHER SPECIFIED DISEASES OF INTESTINE: ICD-10-CM

## 2025-05-13 DIAGNOSIS — E66.9 OBESITY WITH BODY MASS INDEX 30 OR GREATER: ICD-10-CM

## 2025-05-13 DIAGNOSIS — K51.50 LEFT SIDED COLITIS WITHOUT COMPLICATIONS: ICD-10-CM

## 2025-05-13 PROCEDURE — 45380 COLONOSCOPY AND BIOPSY: CPT | Performed by: INTERNAL MEDICINE

## 2025-05-13 PROCEDURE — 00811 ANES LWR INTST NDSC NOS: CPT | Performed by: NURSE ANESTHETIST, CERTIFIED REGISTERED

## 2025-05-13 PROCEDURE — 88305 TISSUE EXAM BY PATHOLOGIST: CPT | Performed by: PATHOLOGY

## 2025-05-13 RX ORDER — ADALIMUMAB 40MG/0.4ML
AS DIRECTED START DAY 28 KIT SUBCUTANEOUS EVERY OTHER WEEK
Qty: 2 PRE-FILLED PEN SYRINGE | Refills: 6 | Status: ACTIVE | COMMUNITY
Start: 2025-04-21 | End: 2025-11-03